# Patient Record
Sex: MALE | Race: WHITE | NOT HISPANIC OR LATINO | URBAN - METROPOLITAN AREA
[De-identification: names, ages, dates, MRNs, and addresses within clinical notes are randomized per-mention and may not be internally consistent; named-entity substitution may affect disease eponyms.]

---

## 2024-08-21 ENCOUNTER — OFFICE VISIT (OUTPATIENT)
Dept: URBAN - METROPOLITAN AREA CLINIC 54 | Facility: CLINIC | Age: 84
End: 2024-08-21
Payer: MEDICARE

## 2024-08-21 VITALS
DIASTOLIC BLOOD PRESSURE: 77 MMHG | HEART RATE: 81 BPM | BODY MASS INDEX: 38.42 KG/M2 | TEMPERATURE: 98.1 F | WEIGHT: 244.8 LBS | SYSTOLIC BLOOD PRESSURE: 134 MMHG | HEIGHT: 67 IN

## 2024-08-21 DIAGNOSIS — K20.80 LOS ANGELES GRADE D ESOPHAGITIS: ICD-10-CM

## 2024-08-21 DIAGNOSIS — K31.7 GASTRIC POLYPS: ICD-10-CM

## 2024-08-21 DIAGNOSIS — R11.2 NAUSEA AND VOMITING, UNSPECIFIED VOMITING TYPE: ICD-10-CM

## 2024-08-21 PROCEDURE — 99214 OFFICE O/P EST MOD 30 MIN: CPT | Performed by: PHYSICIAN ASSISTANT

## 2024-08-21 RX ORDER — OMEPRAZOLE 40 MG/1
1 CAPSULE 30 MINUTES BEFORE MEAL CAPSULE, DELAYED RELEASE ORAL ONCE A DAY
Qty: 90 | Refills: 3

## 2024-08-21 RX ORDER — DUTASTERIDE 0.5 MG/1
TAKE 1 CAPSULE BY MOUTH ONCE DAILY CAPSULE, LIQUID FILLED ORAL
Qty: 90 EACH | Refills: 0 | Status: ACTIVE | COMMUNITY

## 2024-08-21 RX ORDER — ASPIRIN 81 MG/1
1 TABLET TABLET, COATED ORAL ONCE A DAY
Status: ACTIVE | COMMUNITY

## 2024-08-21 RX ORDER — OMEPRAZOLE 40 MG/1
1 CAPSULE 30 MINUTES BEFORE MEAL CAPSULE, DELAYED RELEASE ORAL TWICE DAILY
Status: ACTIVE | COMMUNITY

## 2024-08-21 RX ORDER — METOPROLOL SUCCINATE 50 MG/1
1 TABLET TABLET, FILM COATED, EXTENDED RELEASE ORAL ONCE A DAY
Status: ACTIVE | COMMUNITY

## 2024-08-21 RX ORDER — METHYLNALTREXONE BROMIDE 150 MG/1
3 TABLETS 30 MINUTES BEFORE THE FIRST MEAL OF THE DAY TABLET ORAL ONCE A DAY
Status: ACTIVE | COMMUNITY

## 2024-08-21 RX ORDER — FENTANYL 25 UG/H
PATCH TRANSDERMAL
Qty: 10 EACH | Status: ACTIVE | COMMUNITY

## 2024-08-21 RX ORDER — TRAZODONE HYDROCHLORIDE 50 MG/1
1 TABLET AT BEDTIME AS NEEDED TABLET ORAL ONCE A DAY
Qty: 30 TABLET | Status: ACTIVE | COMMUNITY

## 2024-08-21 RX ORDER — AMLODIPINE BESYLATE 5 MG/1
1 TABLET TABLET ORAL ONCE A DAY
Status: ACTIVE | COMMUNITY

## 2024-08-21 RX ORDER — OXYCODONE HYDROCHLORIDE AND ACETAMINOPHEN 10; 325 MG/1; MG/1
TABLET ORAL
Qty: 60 TABLET | Status: ACTIVE | COMMUNITY

## 2024-08-21 RX ORDER — MELOXICAM 15 MG/1
1 TABLET TABLET ORAL ONCE A DAY
Status: ACTIVE | COMMUNITY

## 2024-08-21 RX ORDER — PIOGLITAZONE 15 MG/1
TAKE 1 TABLET BY MOUTH EVERY DAY TABLET ORAL
Qty: 86 EACH | Refills: 2 | Status: ACTIVE | COMMUNITY

## 2024-08-21 RX ORDER — METOCLOPRAMIDE 5 MG/1
1 TABLET BEFORE MEALS TABLET ORAL
Qty: 40 TABLET | Refills: 0 | Status: ACTIVE | COMMUNITY

## 2024-08-21 RX ORDER — SERTRALINE 50 MG/1
1 TABLET TABLET, FILM COATED ORAL ONCE A DAY
Qty: 30 TABLET | Status: ACTIVE | COMMUNITY

## 2024-08-21 RX ORDER — INSULIN DETEMIR 100 [IU]/ML
AS DIRECTED INJECTION, SOLUTION SUBCUTANEOUS
Status: ACTIVE | COMMUNITY

## 2024-08-21 RX ORDER — SUCRALFATE ORAL 1 G/10ML
SUSPENSION ORAL
Qty: 1200 MILLILITER | Status: ACTIVE | COMMUNITY

## 2024-08-21 RX ORDER — ATORVASTATIN CALCIUM 40 MG/1
1 TABLET TABLET ORAL ONCE A DAY
Qty: 90 TABLET | Refills: 0 | Status: ACTIVE | COMMUNITY

## 2024-08-21 RX ORDER — TAMSULOSIN HYDROCHLORIDE 0.4 MG/1
TAKE 1 CAPSULE BY MOUTH EVERY DAY CAPSULE ORAL
Qty: 90 EACH | Refills: 0 | Status: ACTIVE | COMMUNITY

## 2024-08-21 RX ORDER — INSULIN GLARGINE 100 [IU]/ML
AS DIRECTED INJECTION, SOLUTION SUBCUTANEOUS
Status: ACTIVE | COMMUNITY

## 2024-08-21 RX ORDER — PREGABALIN 75 MG/1
1 CAPSULE IN THE EVENING 1 TO 3 HOURS BEFORE BEDTIME CAPSULE ORAL ONCE A DAY
Status: ACTIVE | COMMUNITY

## 2024-08-21 NOTE — HPI-TODAY'S VISIT:
6/29/23: Pt is an 83 yo male with PMH of DM2, CAD, prior renal failure on HD with subsequent renal recovery who presents for hospital follow up.   Pt recently hospitalized at [facility] for intractable nausea and vomiting. CT abdomen pelvis did reveal some abnormal imaging indicating inflammatory process in the esophagus.   Pt underwent EGD on 4/21/23 by Dr. Holcomb Impression:   1.  [place] grade D esophagitis.  This is the most likely cause of patient's intractable nausea and vomiting. 2.  Multiple gastric polyps status post cold forceps polypectomy.   He denies any further nausea or vomiting since discharge. He mostly c/o phlegm in throat. No longer taking PPI or Carafate. No other GI complaints.  --8/23/2023--  Repeat EGD in July 2023 shows resolution of esophagitis.  There is a small amount of food/residue in the stomach, possibly suggestive of gastroparesis. Patient reports complete resolution of NV. Discontinued alcohol use. Remains on PPI. Recently increased dosage of long acting insulin.  8/21/24: Pt presents for routine follow up. Patient remains stable on daily PPI. No ongoing NV.

## 2025-08-20 ENCOUNTER — OFFICE VISIT (OUTPATIENT)
Dept: URBAN - METROPOLITAN AREA CLINIC 54 | Facility: CLINIC | Age: 85
End: 2025-08-20
Payer: MEDICARE

## 2025-08-20 DIAGNOSIS — K20.80 LOS ANGELES GRADE D ESOPHAGITIS: ICD-10-CM

## 2025-08-20 DIAGNOSIS — R11.2 NAUSEA AND VOMITING, UNSPECIFIED VOMITING TYPE: ICD-10-CM

## 2025-08-20 DIAGNOSIS — K31.7 GASTRIC POLYPS: ICD-10-CM

## 2025-08-20 PROCEDURE — 99214 OFFICE O/P EST MOD 30 MIN: CPT | Performed by: PHYSICIAN ASSISTANT

## 2025-08-20 RX ORDER — DUTASTERIDE 0.5 MG/1
TAKE 1 CAPSULE BY MOUTH ONCE DAILY CAPSULE, LIQUID FILLED ORAL
Qty: 90 EACH | Refills: 0 | Status: ACTIVE | COMMUNITY

## 2025-08-20 RX ORDER — TRAZODONE HYDROCHLORIDE 50 MG/1
1 TABLET AT BEDTIME AS NEEDED TABLET ORAL ONCE A DAY
Qty: 30 TABLET | Status: ACTIVE | COMMUNITY

## 2025-08-20 RX ORDER — PIOGLITAZONE 15 MG/1
TAKE 1 TABLET BY MOUTH EVERY DAY TABLET ORAL
Qty: 86 EACH | Refills: 2 | Status: ACTIVE | COMMUNITY

## 2025-08-20 RX ORDER — METOPROLOL SUCCINATE 50 MG/1
1 TABLET TABLET, FILM COATED, EXTENDED RELEASE ORAL ONCE A DAY
Status: ACTIVE | COMMUNITY

## 2025-08-20 RX ORDER — ATORVASTATIN CALCIUM 40 MG/1
1 TABLET TABLET ORAL ONCE A DAY
Qty: 90 TABLET | Refills: 0 | Status: ACTIVE | COMMUNITY

## 2025-08-20 RX ORDER — FENTANYL 25 UG/H
PATCH TRANSDERMAL
Qty: 10 EACH | Status: ACTIVE | COMMUNITY

## 2025-08-20 RX ORDER — METOCLOPRAMIDE 5 MG/1
1 TABLET BEFORE MEALS TABLET ORAL
Qty: 40 TABLET | Refills: 0 | Status: ACTIVE | COMMUNITY

## 2025-08-20 RX ORDER — INSULIN DETEMIR 100 [IU]/ML
AS DIRECTED INJECTION, SOLUTION SUBCUTANEOUS
Status: ACTIVE | COMMUNITY

## 2025-08-20 RX ORDER — MELOXICAM 15 MG/1
1 TABLET TABLET ORAL ONCE A DAY
Status: ACTIVE | COMMUNITY

## 2025-08-20 RX ORDER — INSULIN GLARGINE 100 [IU]/ML
AS DIRECTED INJECTION, SOLUTION SUBCUTANEOUS
Status: ACTIVE | COMMUNITY

## 2025-08-20 RX ORDER — OMEPRAZOLE 40 MG/1
1 CAPSULE 30 MINUTES BEFORE MEAL CAPSULE, DELAYED RELEASE ORAL ONCE A DAY
Qty: 90 | Refills: 3
Start: 2025-08-20

## 2025-08-20 RX ORDER — AMLODIPINE BESYLATE 5 MG/1
1 TABLET TABLET ORAL ONCE A DAY
Status: ACTIVE | COMMUNITY

## 2025-08-20 RX ORDER — SERTRALINE 50 MG/1
1 TABLET TABLET, FILM COATED ORAL ONCE A DAY
Qty: 30 TABLET | Status: ACTIVE | COMMUNITY

## 2025-08-20 RX ORDER — PREGABALIN 75 MG/1
1 CAPSULE IN THE EVENING 1 TO 3 HOURS BEFORE BEDTIME CAPSULE ORAL ONCE A DAY
Status: ACTIVE | COMMUNITY

## 2025-08-20 RX ORDER — TAMSULOSIN HYDROCHLORIDE 0.4 MG/1
TAKE 1 CAPSULE BY MOUTH EVERY DAY CAPSULE ORAL
Qty: 90 EACH | Refills: 0 | Status: ACTIVE | COMMUNITY

## 2025-08-20 RX ORDER — OMEPRAZOLE 40 MG/1
1 CAPSULE 30 MINUTES BEFORE MEAL CAPSULE, DELAYED RELEASE ORAL ONCE A DAY
Qty: 90 | Refills: 3 | Status: ACTIVE | COMMUNITY

## 2025-08-20 RX ORDER — SUCRALFATE ORAL 1 G/10ML
SUSPENSION ORAL
Qty: 1200 MILLILITER | Status: ACTIVE | COMMUNITY

## 2025-08-20 RX ORDER — ASPIRIN 81 MG/1
1 TABLET TABLET, COATED ORAL ONCE A DAY
Status: ACTIVE | COMMUNITY

## 2025-08-20 RX ORDER — OXYCODONE HYDROCHLORIDE AND ACETAMINOPHEN 10; 325 MG/1; MG/1
TABLET ORAL
Qty: 60 TABLET | Status: ACTIVE | COMMUNITY

## 2025-08-20 RX ORDER — METHYLNALTREXONE BROMIDE 150 MG/1
3 TABLETS 30 MINUTES BEFORE THE FIRST MEAL OF THE DAY TABLET ORAL ONCE A DAY
Status: ACTIVE | COMMUNITY